# Patient Record
Sex: MALE | Race: WHITE | Employment: FULL TIME | URBAN - METROPOLITAN AREA
[De-identification: names, ages, dates, MRNs, and addresses within clinical notes are randomized per-mention and may not be internally consistent; named-entity substitution may affect disease eponyms.]

---

## 2020-09-09 ENCOUNTER — HOSPITAL ENCOUNTER (EMERGENCY)
Facility: HOSPITAL | Age: 30
Discharge: HOME | End: 2020-09-09
Attending: EMERGENCY MEDICINE
Payer: COMMERCIAL

## 2020-09-09 ENCOUNTER — HOSPITAL ENCOUNTER (OUTPATIENT)
Age: 30
Discharge: HOME | End: 2020-09-09
Attending: EMERGENCY MEDICINE
Payer: COMMERCIAL

## 2020-09-09 ENCOUNTER — APPOINTMENT (OUTPATIENT)
Dept: GENERAL RADIOLOGY | Facility: HOSPITAL | Age: 30
End: 2020-09-09
Attending: EMERGENCY MEDICINE
Payer: COMMERCIAL

## 2020-09-09 VITALS
HEART RATE: 79 BPM | BODY MASS INDEX: 32.88 KG/M2 | TEMPERATURE: 98 F | HEIGHT: 76 IN | SYSTOLIC BLOOD PRESSURE: 150 MMHG | RESPIRATION RATE: 18 BRPM | OXYGEN SATURATION: 97 % | DIASTOLIC BLOOD PRESSURE: 107 MMHG | WEIGHT: 270 LBS

## 2020-09-09 VITALS
OXYGEN SATURATION: 99 % | DIASTOLIC BLOOD PRESSURE: 90 MMHG | TEMPERATURE: 98 F | HEART RATE: 59 BPM | BODY MASS INDEX: 33 KG/M2 | SYSTOLIC BLOOD PRESSURE: 148 MMHG | WEIGHT: 268.94 LBS | RESPIRATION RATE: 18 BRPM

## 2020-09-09 DIAGNOSIS — I10 HYPERTENSION, UNSPECIFIED TYPE: ICD-10-CM

## 2020-09-09 DIAGNOSIS — J06.9 UPPER RESPIRATORY TRACT INFECTION, UNSPECIFIED TYPE: Primary | ICD-10-CM

## 2020-09-09 DIAGNOSIS — R07.9 CHEST PAIN OF UNCERTAIN ETIOLOGY: Primary | ICD-10-CM

## 2020-09-09 LAB
ANION GAP SERPL CALC-SCNC: 5 MMOL/L (ref 0–18)
BASOPHILS # BLD AUTO: 0.06 X10(3) UL (ref 0–0.2)
BASOPHILS NFR BLD AUTO: 0.9 %
BUN BLD-MCNC: 10 MG/DL (ref 7–18)
BUN/CREAT SERPL: 8.5 (ref 10–20)
CALCIUM BLD-MCNC: 9.5 MG/DL (ref 8.5–10.1)
CHLORIDE SERPL-SCNC: 104 MMOL/L (ref 98–112)
CO2 SERPL-SCNC: 29 MMOL/L (ref 21–32)
CREAT BLD-MCNC: 1.18 MG/DL (ref 0.7–1.3)
D DIMER PPP FEU-MCNC: <0.27 UG/ML FEU (ref ?–0.5)
DEPRECATED RDW RBC AUTO: 37.9 FL (ref 35.1–46.3)
EOSINOPHIL # BLD AUTO: 0.14 X10(3) UL (ref 0–0.7)
EOSINOPHIL NFR BLD AUTO: 2.1 %
ERYTHROCYTE [DISTWIDTH] IN BLOOD BY AUTOMATED COUNT: 12.1 % (ref 11–15)
GLUCOSE BLD-MCNC: 96 MG/DL (ref 70–99)
HCT VFR BLD AUTO: 49.4 % (ref 39–53)
HGB BLD-MCNC: 17.3 G/DL (ref 13–17.5)
IMM GRANULOCYTES # BLD AUTO: 0.02 X10(3) UL (ref 0–1)
IMM GRANULOCYTES NFR BLD: 0.3 %
LYMPHOCYTES # BLD AUTO: 1.34 X10(3) UL (ref 1–4)
LYMPHOCYTES NFR BLD AUTO: 20.4 %
MCH RBC QN AUTO: 30.3 PG (ref 26–34)
MCHC RBC AUTO-ENTMCNC: 35 G/DL (ref 31–37)
MCV RBC AUTO: 86.5 FL (ref 80–100)
MONOCYTES # BLD AUTO: 0.37 X10(3) UL (ref 0.1–1)
MONOCYTES NFR BLD AUTO: 5.6 %
NEUTROPHILS # BLD AUTO: 4.65 X10 (3) UL (ref 1.5–7.7)
NEUTROPHILS # BLD AUTO: 4.65 X10(3) UL (ref 1.5–7.7)
NEUTROPHILS NFR BLD AUTO: 70.7 %
OSMOLALITY SERPL CALC.SUM OF ELEC: 285 MOSM/KG (ref 275–295)
PLATELET # BLD AUTO: 237 10(3)UL (ref 150–450)
POTASSIUM SERPL-SCNC: 3.8 MMOL/L (ref 3.5–5.1)
RBC # BLD AUTO: 5.71 X10(6)UL (ref 4.3–5.7)
SODIUM SERPL-SCNC: 138 MMOL/L (ref 136–145)
TROPONIN I SERPL-MCNC: <0.045 NG/ML (ref ?–0.04)
WBC # BLD AUTO: 6.6 X10(3) UL (ref 4–11)

## 2020-09-09 PROCEDURE — 99204 OFFICE O/P NEW MOD 45 MIN: CPT

## 2020-09-09 PROCEDURE — 84484 ASSAY OF TROPONIN QUANT: CPT | Performed by: EMERGENCY MEDICINE

## 2020-09-09 PROCEDURE — 93005 ELECTROCARDIOGRAM TRACING: CPT

## 2020-09-09 PROCEDURE — 36415 COLL VENOUS BLD VENIPUNCTURE: CPT

## 2020-09-09 PROCEDURE — 71045 X-RAY EXAM CHEST 1 VIEW: CPT | Performed by: EMERGENCY MEDICINE

## 2020-09-09 PROCEDURE — 93010 ELECTROCARDIOGRAM REPORT: CPT | Performed by: EMERGENCY MEDICINE

## 2020-09-09 PROCEDURE — 93010 ELECTROCARDIOGRAM REPORT: CPT

## 2020-09-09 PROCEDURE — 80048 BASIC METABOLIC PNL TOTAL CA: CPT | Performed by: EMERGENCY MEDICINE

## 2020-09-09 PROCEDURE — 85379 FIBRIN DEGRADATION QUANT: CPT | Performed by: EMERGENCY MEDICINE

## 2020-09-09 PROCEDURE — 85025 COMPLETE CBC W/AUTO DIFF WBC: CPT | Performed by: EMERGENCY MEDICINE

## 2020-09-09 PROCEDURE — 99284 EMERGENCY DEPT VISIT MOD MDM: CPT

## 2020-09-09 RX ORDER — ALBUTEROL SULFATE 90 UG/1
2 AEROSOL, METERED RESPIRATORY (INHALATION) EVERY 4 HOURS PRN
Qty: 1 INHALER | Refills: 0 | Status: SHIPPED | OUTPATIENT
Start: 2020-09-09 | End: 2020-10-09

## 2020-09-09 NOTE — ED PROVIDER NOTES
Patient Seen in: [facility] AND [facility] Emergency Department      History   Patient presents with:  Chest Pain Angina    Stated Complaint: chest pain from IC    HPI    Patient is a 26-year-old male who complains of chest discomfort he states he has had interm light.   Neck: Neck supple. Cardiovascular: Normal rate, regular rhythm, normal heart sounds and intact distal pulses. Pulmonary/Chest: Effort normal and breath sounds faint expiratory wheeze. No respiratory distress. Abdominal: Soft.  Bowel sounds a changes              Xr Chest Ap Portable  (cpt=71045)    Result Date: 9/9/2020  CONCLUSION: Normal examination. Dictated by (CST): Cm Santacruz MD on 9/09/2020 at 2:22 PM     Finalized by (CST):  Cm Santacruz MD on 9/09/2020 at 2:23 PM

## 2020-09-09 NOTE — ED PROVIDER NOTES
Patient Seen in: [facility] [place]      History   Patient presents with:  Chest Pressure    Stated Complaint: chest tightness    HPI    The patient is a 61-year-old male with past history of tobacco abuse, vaping who presents no Well-developed well-nourished in no acute distress  Head: Normocephalic, no swelling or tenderness  Eyes: Nonicteric sclera, no conjunctival injection  ENT: TMs are clear and flat bilaterally.   There is no posterior pharyngeal erythema  Chest: Clear to [place]

## 2020-09-09 NOTE — ED INITIAL ASSESSMENT (HPI)
PATIENT ARRIVED AMBULATORY TO ROOM C/O \"CHEST PRESSURE AND CHEST TIGHTNESS\" THAT STARTED YESTERDAY. PATIENT STATES THE PRESSURE TODAY IS CONCENTRATED IN HIS GENERALIZED CHEST. NO COUGH. NO NASAL CONGESTION. NO FEVERS.  PATIENT STATES \"I FEEL FUNNY WHEN I

## 2020-09-09 NOTE — ED INITIAL ASSESSMENT (HPI)
Pt presents with 4 days of mid sternal CP and difficulty taking a deep breath. Pt denies n/v, denies cough, fever or diaphoresis.  Sent from [address] [place] [address] for further eval

## 2020-09-10 LAB — SARS-COV-2 RNA RESP QL NAA+PROBE: NOT DETECTED

## 2023-02-14 ENCOUNTER — APPOINTMENT (OUTPATIENT)
Dept: GENERAL RADIOLOGY | Facility: HOSPITAL | Age: 33
End: 2023-02-14
Payer: COMMERCIAL

## 2023-02-14 ENCOUNTER — HOSPITAL ENCOUNTER (EMERGENCY)
Facility: HOSPITAL | Age: 33
Discharge: HOME | End: 2023-02-14
Attending: EMERGENCY MEDICINE
Payer: COMMERCIAL

## 2023-02-14 VITALS
DIASTOLIC BLOOD PRESSURE: 97 MMHG | HEIGHT: 76 IN | OXYGEN SATURATION: 98 % | SYSTOLIC BLOOD PRESSURE: 162 MMHG | BODY MASS INDEX: 32.27 KG/M2 | RESPIRATION RATE: 16 BRPM | WEIGHT: 265 LBS | TEMPERATURE: 98 F | HEART RATE: 97 BPM

## 2023-02-14 DIAGNOSIS — M54.12 CERVICAL RADICULOPATHY: Primary | ICD-10-CM

## 2023-02-14 LAB
ALBUMIN SERPL-MCNC: 3.8 G/DL (ref 3.4–5)
ALBUMIN/GLOB SERPL: 1.2 {RATIO} (ref 1–2)
ALP LIVER SERPL-CCNC: 70 U/L
ALT SERPL-CCNC: 28 U/L
ANION GAP SERPL CALC-SCNC: 3 MMOL/L (ref 0–18)
AST SERPL-CCNC: 26 U/L (ref 15–37)
ATRIAL RATE: 77 BPM
BASOPHILS # BLD AUTO: 0.07 X10(3) UL (ref 0–0.2)
BASOPHILS NFR BLD AUTO: 1.1 %
BILIRUB SERPL-MCNC: 0.9 MG/DL (ref 0.1–2)
BUN BLD-MCNC: 16 MG/DL (ref 7–18)
CALCIUM BLD-MCNC: 8.8 MG/DL (ref 8.5–10.1)
CHLORIDE SERPL-SCNC: 109 MMOL/L (ref 98–112)
CO2 SERPL-SCNC: 26 MMOL/L (ref 21–32)
CREAT BLD-MCNC: 1.21 MG/DL
EOSINOPHIL # BLD AUTO: 0.34 X10(3) UL (ref 0–0.7)
EOSINOPHIL NFR BLD AUTO: 5.6 %
ERYTHROCYTE [DISTWIDTH] IN BLOOD BY AUTOMATED COUNT: 11.9 %
GFR SERPLBLD BASED ON 1.73 SQ M-ARVRAT: 82 ML/MIN/1.73M2 (ref 60–?)
GLOBULIN PLAS-MCNC: 3.3 G/DL (ref 2.8–4.4)
GLUCOSE BLD-MCNC: 92 MG/DL (ref 70–99)
HCT VFR BLD AUTO: 44.4 %
HGB BLD-MCNC: 15.9 G/DL
IMM GRANULOCYTES # BLD AUTO: 0.01 X10(3) UL (ref 0–1)
IMM GRANULOCYTES NFR BLD: 0.2 %
LYMPHOCYTES # BLD AUTO: 1.54 X10(3) UL (ref 1–4)
LYMPHOCYTES NFR BLD AUTO: 25.2 %
MCH RBC QN AUTO: 31.1 PG (ref 26–34)
MCHC RBC AUTO-ENTMCNC: 35.8 G/DL (ref 31–37)
MCV RBC AUTO: 86.7 FL
MONOCYTES # BLD AUTO: 0.34 X10(3) UL (ref 0.1–1)
MONOCYTES NFR BLD AUTO: 5.6 %
NEUTROPHILS # BLD AUTO: 3.8 X10 (3) UL (ref 1.5–7.7)
NEUTROPHILS # BLD AUTO: 3.8 X10(3) UL (ref 1.5–7.7)
NEUTROPHILS NFR BLD AUTO: 62.3 %
OSMOLALITY SERPL CALC.SUM OF ELEC: 287 MOSM/KG (ref 275–295)
P AXIS: 68 DEGREES
P-R INTERVAL: 146 MS
PLATELET # BLD AUTO: 215 10(3)UL (ref 150–450)
POTASSIUM SERPL-SCNC: 3.8 MMOL/L (ref 3.5–5.1)
PROT SERPL-MCNC: 7.1 G/DL (ref 6.4–8.2)
Q-T INTERVAL: 370 MS
QRS DURATION: 118 MS
QTC CALCULATION (BEZET): 418 MS
R AXIS: 61 DEGREES
RBC # BLD AUTO: 5.12 X10(6)UL
SODIUM SERPL-SCNC: 138 MMOL/L (ref 136–145)
T AXIS: 41 DEGREES
TROPONIN I HIGH SENSITIVITY: 4 NG/L
VENTRICULAR RATE: 77 BPM
WBC # BLD AUTO: 6.1 X10(3) UL (ref 4–11)

## 2023-02-14 PROCEDURE — 93010 ELECTROCARDIOGRAM REPORT: CPT

## 2023-02-14 PROCEDURE — 93005 ELECTROCARDIOGRAM TRACING: CPT

## 2023-02-14 PROCEDURE — 80053 COMPREHEN METABOLIC PANEL: CPT | Performed by: EMERGENCY MEDICINE

## 2023-02-14 PROCEDURE — 84484 ASSAY OF TROPONIN QUANT: CPT | Performed by: EMERGENCY MEDICINE

## 2023-02-14 PROCEDURE — 85025 COMPLETE CBC W/AUTO DIFF WBC: CPT | Performed by: EMERGENCY MEDICINE

## 2023-02-14 PROCEDURE — 99284 EMERGENCY DEPT VISIT MOD MDM: CPT

## 2023-02-14 PROCEDURE — 84484 ASSAY OF TROPONIN QUANT: CPT

## 2023-02-14 PROCEDURE — 71045 X-RAY EXAM CHEST 1 VIEW: CPT

## 2023-02-14 PROCEDURE — 36415 COLL VENOUS BLD VENIPUNCTURE: CPT

## 2023-02-14 PROCEDURE — 85025 COMPLETE CBC W/AUTO DIFF WBC: CPT

## 2023-02-14 PROCEDURE — 99285 EMERGENCY DEPT VISIT HI MDM: CPT

## 2023-02-14 PROCEDURE — 80053 COMPREHEN METABOLIC PANEL: CPT

## 2023-02-14 RX ORDER — PREDNISONE 20 MG/1
60 TABLET ORAL DAILY
Qty: 15 TABLET | Refills: 0 | Status: SHIPPED | OUTPATIENT
Start: 2023-02-14 | End: 2023-02-19

## 2023-02-14 NOTE — ED INITIAL ASSESSMENT (HPI)
Pt states he woke today with left arm pain.  Pt states pain is shooting and moved from hand to forearm into chest.

## 2023-02-14 NOTE — DISCHARGE INSTRUCTIONS
Motrin every 6 hours for pain    Take ibuprofen 600 mg 4 times daily    Return if any worsening or increased symptoms.     You may need an MRI of your cervical spine to rule out disc herniation if symptoms persist

## 2023-03-06 ENCOUNTER — OFFICE VISIT (OUTPATIENT)
Dept: INTERNAL MEDICINE CLINIC | Facility: CLINIC | Age: 33
End: 2023-03-06

## 2023-03-06 VITALS
BODY MASS INDEX: 33.49 KG/M2 | DIASTOLIC BLOOD PRESSURE: 92 MMHG | SYSTOLIC BLOOD PRESSURE: 135 MMHG | WEIGHT: 275 LBS | HEIGHT: 76 IN | HEART RATE: 68 BPM

## 2023-03-06 DIAGNOSIS — M54.12 CERVICAL RADICULOPATHY: ICD-10-CM

## 2023-03-06 DIAGNOSIS — Z13.21 ENCOUNTER FOR VITAMIN DEFICIENCY SCREENING: ICD-10-CM

## 2023-03-06 DIAGNOSIS — I10 ESSENTIAL HYPERTENSION: ICD-10-CM

## 2023-03-06 DIAGNOSIS — R07.9 CHEST PAIN, UNSPECIFIED TYPE: Primary | ICD-10-CM

## 2023-03-06 PROCEDURE — 3008F BODY MASS INDEX DOCD: CPT | Performed by: NURSE PRACTITIONER

## 2023-03-06 PROCEDURE — 3075F SYST BP GE 130 - 139MM HG: CPT | Performed by: NURSE PRACTITIONER

## 2023-03-06 PROCEDURE — 99204 OFFICE O/P NEW MOD 45 MIN: CPT | Performed by: NURSE PRACTITIONER

## 2023-03-06 PROCEDURE — 3080F DIAST BP >= 90 MM HG: CPT | Performed by: NURSE PRACTITIONER

## 2023-03-06 RX ORDER — AMLODIPINE BESYLATE 5 MG/1
5 TABLET ORAL DAILY
Qty: 90 TABLET | Refills: 0 | Status: SHIPPED | OUTPATIENT
Start: 2023-03-06 | End: 2023-06-04

## 2023-03-07 ENCOUNTER — TELEPHONE (OUTPATIENT)
Dept: INTERNAL MEDICINE CLINIC | Facility: CLINIC | Age: 33
End: 2023-03-07

## 2023-03-07 NOTE — TELEPHONE ENCOUNTER
Called Cardiology to schedule an appt for patient. Patient is schedule for March 13 at 3:20pm at [address] in [place] [address]. Patient was informed about the appt, patient didn't have no further questions.

## 2023-03-08 ENCOUNTER — LAB ENCOUNTER (OUTPATIENT)
Dept: LAB | Age: 33
End: 2023-03-08
Attending: NURSE PRACTITIONER
Payer: COMMERCIAL

## 2023-03-08 DIAGNOSIS — R07.9 CHEST PAIN, UNSPECIFIED TYPE: ICD-10-CM

## 2023-03-08 DIAGNOSIS — I10 ESSENTIAL HYPERTENSION: ICD-10-CM

## 2023-03-08 DIAGNOSIS — Z13.21 ENCOUNTER FOR VITAMIN DEFICIENCY SCREENING: ICD-10-CM

## 2023-03-08 LAB
CHOLEST SERPL-MCNC: 158 MG/DL (ref ?–200)
FASTING PATIENT LIPID ANSWER: YES
HDLC SERPL-MCNC: 54 MG/DL (ref 40–59)
LDLC SERPL CALC-MCNC: 88 MG/DL (ref ?–100)
NONHDLC SERPL-MCNC: 104 MG/DL (ref ?–130)
TRIGL SERPL-MCNC: 82 MG/DL (ref 30–149)
TSI SER-ACNC: 1.23 MIU/ML (ref 0.36–3.74)
VIT D+METAB SERPL-MCNC: 11 NG/ML (ref 30–100)
VLDLC SERPL CALC-MCNC: 13 MG/DL (ref 0–30)

## 2023-03-08 PROCEDURE — 36415 COLL VENOUS BLD VENIPUNCTURE: CPT

## 2023-03-08 PROCEDURE — 80061 LIPID PANEL: CPT

## 2023-03-08 PROCEDURE — 82306 VITAMIN D 25 HYDROXY: CPT

## 2023-03-08 PROCEDURE — 84443 ASSAY THYROID STIM HORMONE: CPT

## 2023-05-18 ENCOUNTER — OFFICE VISIT (OUTPATIENT)
Dept: INTERNAL MEDICINE CLINIC | Facility: CLINIC | Age: 33
End: 2023-05-18

## 2023-05-18 ENCOUNTER — HOSPITAL ENCOUNTER (EMERGENCY)
Facility: HOSPITAL | Age: 33
Discharge: HOME | End: 2023-05-18
Attending: EMERGENCY MEDICINE
Payer: COMMERCIAL

## 2023-05-18 ENCOUNTER — PATIENT MESSAGE (OUTPATIENT)
Dept: INTERNAL MEDICINE CLINIC | Facility: CLINIC | Age: 33
End: 2023-05-18

## 2023-05-18 VITALS
DIASTOLIC BLOOD PRESSURE: 74 MMHG | SYSTOLIC BLOOD PRESSURE: 138 MMHG | RESPIRATION RATE: 18 BRPM | TEMPERATURE: 98 F | OXYGEN SATURATION: 98 % | HEART RATE: 78 BPM | BODY MASS INDEX: 33 KG/M2 | WEIGHT: 273 LBS

## 2023-05-18 VITALS
WEIGHT: 273 LBS | SYSTOLIC BLOOD PRESSURE: 133 MMHG | HEIGHT: 76 IN | DIASTOLIC BLOOD PRESSURE: 85 MMHG | BODY MASS INDEX: 33.24 KG/M2 | HEART RATE: 73 BPM

## 2023-05-18 DIAGNOSIS — H57.02 ANISOCORIA: Primary | ICD-10-CM

## 2023-05-18 PROCEDURE — 99282 EMERGENCY DEPT VISIT SF MDM: CPT

## 2023-05-18 PROCEDURE — 3075F SYST BP GE 130 - 139MM HG: CPT | Performed by: NURSE PRACTITIONER

## 2023-05-18 PROCEDURE — 99283 EMERGENCY DEPT VISIT LOW MDM: CPT

## 2023-05-18 PROCEDURE — 99213 OFFICE O/P EST LOW 20 MIN: CPT | Performed by: NURSE PRACTITIONER

## 2023-05-18 PROCEDURE — 3008F BODY MASS INDEX DOCD: CPT | Performed by: NURSE PRACTITIONER

## 2023-05-18 PROCEDURE — 3079F DIAST BP 80-89 MM HG: CPT | Performed by: NURSE PRACTITIONER

## 2023-05-18 RX ORDER — SERTRALINE HYDROCHLORIDE 25 MG/1
TABLET, FILM COATED ORAL
COMMUNITY
Start: 2023-03-01

## 2023-05-18 NOTE — ED INITIAL ASSESSMENT (HPI)
Patient complains of headache and bilateral dilation of pupils, larger to left, x 4 days. Saw optho today who suggested MRA. Patient denies visual changes. A/o x 4.

## 2023-05-19 NOTE — DISCHARGE INSTRUCTIONS
Take Tylenol as needed for headache. Stay hydrated and get rest.    See primary care for follow-up. Return to the ER if you develop worsening symptoms, sudden severe headache, change in vision, weakness or numbness on one side your body, slurred speech, or any emergent concerns.

## 2023-05-19 NOTE — TELEPHONE ENCOUNTER
Darnell Banegas, please see patient's mychart message. He did go to ER yesterday but they did not do any testing.

## 2023-05-22 ENCOUNTER — HOSPITAL ENCOUNTER (OUTPATIENT)
Dept: MRI IMAGING | Age: 33
Discharge: HOME | End: 2023-05-22
Attending: NURSE PRACTITIONER
Payer: COMMERCIAL

## 2023-05-22 DIAGNOSIS — H57.02 ANISOCORIA: ICD-10-CM

## 2023-05-22 PROCEDURE — 70544 MR ANGIOGRAPHY HEAD W/O DYE: CPT | Performed by: NURSE PRACTITIONER

## 2023-07-10 ENCOUNTER — PATIENT MESSAGE (OUTPATIENT)
Dept: INTERNAL MEDICINE CLINIC | Facility: CLINIC | Age: 33
End: 2023-07-10

## 2023-07-11 NOTE — TELEPHONE ENCOUNTER
From: Nitesh Warren  To: OZIEL Nixon  Sent: 7/10/2023 10:26 AM CDT  Subject: MRI    Hello,    Insurance has denied to cover the MRI that was suggested to check my brain for anything related to my eye problem.  I would like to see if you can reach out and let them know you thought it was a necessary test.    Thanks,    Mikey Canseco

## 2023-07-11 NOTE — TELEPHONE ENCOUNTER
[employer] message with [facility] OZIEL recommendation sent to pt.      Future Appointments   Date Time Provider Roshan Briana   7/18/2023  3:00 PM Rafal Lopez MD PM[facility] ADO  [place]

## 2023-07-11 NOTE — TELEPHONE ENCOUNTER
Pt was evaluated by MRA in the ER which was negative. The next step would be to see the ophthalmologist. They will decide if any further brain imaging is necessary but he also needs a dilated eye exam to evaluate the anisocoria. Thank you!

## 2023-07-18 ENCOUNTER — HOSPITAL ENCOUNTER (OUTPATIENT)
Dept: GENERAL RADIOLOGY | Age: 33
Discharge: HOME | End: 2023-07-18
Attending: PHYSICAL MEDICINE & REHABILITATION
Payer: COMMERCIAL

## 2023-07-18 ENCOUNTER — OFFICE VISIT (OUTPATIENT)
Dept: PHYSICAL MEDICINE AND REHAB | Facility: CLINIC | Age: 33
End: 2023-07-18
Payer: COMMERCIAL

## 2023-07-18 VITALS — HEART RATE: 80 BPM | SYSTOLIC BLOOD PRESSURE: 152 MMHG | DIASTOLIC BLOOD PRESSURE: 88 MMHG | OXYGEN SATURATION: 99 %

## 2023-07-18 DIAGNOSIS — R20.0 NUMBNESS AND TINGLING IN LEFT ARM: ICD-10-CM

## 2023-07-18 DIAGNOSIS — R20.0 NUMBNESS AND TINGLING IN LEFT ARM: Primary | ICD-10-CM

## 2023-07-18 DIAGNOSIS — R20.2 NUMBNESS AND TINGLING IN LEFT ARM: Primary | ICD-10-CM

## 2023-07-18 DIAGNOSIS — R20.2 NUMBNESS AND TINGLING IN LEFT ARM: ICD-10-CM

## 2023-07-18 DIAGNOSIS — R29.2 HOFFMAN SIGN PRESENT: ICD-10-CM

## 2023-07-18 PROCEDURE — 72050 X-RAY EXAM NECK SPINE 4/5VWS: CPT | Performed by: PHYSICAL MEDICINE & REHABILITATION

## 2023-07-18 PROCEDURE — 3077F SYST BP >= 140 MM HG: CPT | Performed by: PHYSICAL MEDICINE & REHABILITATION

## 2023-07-18 PROCEDURE — 3079F DIAST BP 80-89 MM HG: CPT | Performed by: PHYSICAL MEDICINE & REHABILITATION

## 2023-07-18 PROCEDURE — 99204 OFFICE O/P NEW MOD 45 MIN: CPT | Performed by: PHYSICAL MEDICINE & REHABILITATION

## 2023-07-18 NOTE — PATIENT INSTRUCTIONS
1) Please call and schedule your MRI cervical spine at [phone]. Once you have your MRI scheduled, then call my office again to schedule a follow-up visit soon after your MRI so we may review the images together. 2) Schedule EMG/Nerve study on your way out today  3) Please get X-rays of the Cervical spine today on your way out.

## 2023-07-18 NOTE — PROGRESS NOTES
[address]  Progress Note    CHIEF COMPLAINT:  Patient presents with:  New Patient: Left sided arm pain and numbness . Patient was in  the ER on 5/18 and was diagnosed with a pinched nerve. He complains about Tingling in finger tips and pain of 5/10 . Pain is worse when he gets it in the  wrist. Pain can vary on any spot on the left arm. He describes it as having a heart attack when he does have a pain attack. This all started 4 months ago. Denies injury. No imaging. No prior PT nor csi. History of Present Illness: The patient is a 33year old right-handed male who presents with left arm tingling radiating into the left hand which has been ongoing for the last couple of months without an inciting event. He was seen in the emergency department where an MRA of the brain was performed to evaluate for stroke or aneurysm. This was negative. Since then, he has had a cardiac work-up which has also been negative. Today he rates his discomfort a 5 out of 10. He is unclear of what aggravates or exacerbates his symptoms. He has noticed that when he is laying supine in his bed or also driving. He is unclear of what resolves his symptoms. He is not taking any medications for this. No imaging has been performed. No electrodiagnostics performed. He denies injections or therapies. He denies any weakness or incontinence. PAST MEDICAL HISTORY:  Past Medical History:   Diagnosis Date    Essential hypertension        SURGICAL HISTORY:  History reviewed. No pertinent surgical history. SOCIAL HISTORY:   Social History    Occupational History      Not on file    Tobacco Use      Smoking status: Former      Smokeless tobacco: Never    Vaping Use      Vaping Use: Every day    Substance and Sexual Activity      Alcohol use: Yes        Comment: OCC      Drug use: Yes        Types: Cannabis      Sexual activity: Not on file      FAMILY HISTORY:   History reviewed.  No pertinent family history. CURRENT MEDICATIONS:   Current Outpatient Medications   Medication Sig Dispense Refill    amLODIPine 5 MG Oral Tab Take 1 tablet (5 mg total) by mouth daily. 90 tablet 3    sertraline 25 MG Oral Tab  (Patient not taking: Reported on 7/18/2023)         ALLERGIES:   No Known Allergies    REVIEW OF SYSTEMS:   Review of Systems   Constitutional: Negative. HENT: Negative. Eyes: Negative. Respiratory: Negative. Cardiovascular: Negative. Gastrointestinal: Negative. Genitourinary: Negative. Musculoskeletal: As per HPI  Skin: Negative. Neurological: As per HPI  Endo/Heme/Allergies: Negative. Psychiatric/Behavioral: Negative. All other systems reviewed and are negative. Pertinent positives and negatives noted in the HPI. PHYSICAL EXAM:   /88   Pulse 80   SpO2 99%     There is no height or weight on file to calculate BMI. General: No immediate distress  Head: Normocephalic/ Atraumatic  Eyes: Extra-occular movements intact. Ears: No auricular hematoma or deformities  Mouth: No lesions or ulcerations  Heart: peripheral pulses intact. Normal capillary refill. Lungs: Non-labored respirations  Abdomen: No abdominal guarding  Extremities: No lower extremity edema bilaterally   Skin: No lesions noted. Cognition: alert & oriented x 3, attentive, able to follow 2 step commands, comprehention intact, spontaneous speech intact  Motor:    Musculoskeletal:    CERVICAL SPINE:  Inspection: no erythema, swelling, or obvious deformity  Palpation: no ttp over spinous process or paraspinal muscles bilaterally. No tenderness to palpation over Z-joints bilaterally.    ROM: intact to all planes of motion of cervical spine including side-bend bilaterally, rotation bilaterally, flexion, and extension   Strength: 5/5 in all myotomes of the BILATERAL upper extremities   Sensation: Intact to light touch in all dermatomes of the BILATERAL upper extremities   Reflexes: 3/4 at C5, C6, C7 with a positive Kenney's sign bilaterally  Spurling Test: negative for radicular symptoms down either extremity bilaterally  Tinel's sign: Positive at the right elbow and wrist as well as left wrist      Gait:  Normal    Data  [facility] Lab Encounter on 03/08/2023   Component Date Value Ref Range Status    TSH 03/08/2023 1.230  0.358 - 3.740 mIU/mL Final    Cholesterol, Total 03/08/2023 158  <200 mg/dL Final    HDL Cholesterol 03/08/2023 54  40 - 59 mg/dL Final    Triglycerides 03/08/2023 82  30 - 149 mg/dL Final    LDL Cholesterol 03/08/2023 88  <100 mg/dL Final    VLDL 03/08/2023 13  0 - 30 mg/dL Final    Non HDL Chol 03/08/2023 104  <130 mg/dL Final    Patient Fasting for Lipid? 03/08/2023 Yes   Final    Vitamin D, 25OH, Total 03/08/2023 11.0 (L)  30.0 - 100.0 ng/mL Final   Admission on 02/14/2023, Discharged on 02/14/2023   Component Date Value Ref Range Status    Ventricular rate 02/14/2023 77  BPM Final    Atrial rate 02/14/2023 77  BPM Final    P-R Interval 02/14/2023 146  ms Final    QRS Duration 02/14/2023 118  ms Final    Q-T Interval 02/14/2023 370  ms Final    QTC Calculation (Bezet) 02/14/2023 418  ms Final    P Axis 02/14/2023 68  degrees Final    R Axis 02/14/2023 61  degrees Final    T Axis 02/14/2023 41  degrees Final    Hold Lavender 02/14/2023 Auto Resulted   Final    Hold Lt Green 02/14/2023 Auto Resulted   Final    Hold Blue 02/14/2023 Auto Resulted   Final    Glucose 02/14/2023 92  70 - 99 mg/dL Final    Sodium 02/14/2023 138  136 - 145 mmol/L Final    Potassium 02/14/2023 3.8  3.5 - 5.1 mmol/L Final    Chloride 02/14/2023 109  98 - 112 mmol/L Final    CO2 02/14/2023 26.0  21.0 - 32.0 mmol/L Final    Anion Gap 02/14/2023 3  0 - 18 mmol/L Final    BUN 02/14/2023 16  7 - 18 mg/dL Final    Creatinine 02/14/2023 1.21  0.70 - 1.30 mg/dL Final    Calcium, Total 02/14/2023 8.8  8.5 - 10.1 mg/dL Final    Calculated Osmolality 02/14/2023 287  275 - 295 mOsm/kg Final    eGFR-Cr 02/14/2023 82 >=60 mL/min/1.73m2 Final    AST 02/14/2023 26  15 - 37 U/L Final    ALT 02/14/2023 28  16 - 61 U/L Final    Alkaline Phosphatase 02/14/2023 70  45 - 117 U/L Final    Bilirubin, Total 02/14/2023 0.9  0.1 - 2.0 mg/dL Final    Total Protein 02/14/2023 7.1  6.4 - 8.2 g/dL Final    Albumin 02/14/2023 3.8  3.4 - 5.0 g/dL Final    Globulin  02/14/2023 3.3  2.8 - 4.4 g/dL Final    A/G Ratio 02/14/2023 1.2  1.0 - 2.0 Final    Troponin I (High Sensitivity) 02/14/2023 4  <=79 ng/L Final    WBC 02/14/2023 6.1  4.0 - 11.0 x10(3) uL Final    RBC 02/14/2023 5.12  4.30 - 5.70 x10(6)uL Final    HGB 02/14/2023 15.9  13.0 - 17.5 g/dL Final    HCT 02/14/2023 44.4  39.0 - 53.0 % Final    PLT 02/14/2023 215.0  150.0 - 450.0 10(3)uL Final    MCV 02/14/2023 86.7  80.0 - 100.0 fL Final    MCH 02/14/2023 31.1  26.0 - 34.0 pg Final    MCHC 02/14/2023 35.8  31.0 - 37.0 g/dL Final    RDW 02/14/2023 11.9  % Final    Neutrophil Absolute Prelim 02/14/2023 3.80  1.50 - 7.70 x10 (3) uL Final    Neutrophil Absolute 02/14/2023 3.80  1.50 - 7.70 x10(3) uL Final    Lymphocyte Absolute 02/14/2023 1.54  1.00 - 4.00 x10(3) uL Final    Monocyte Absolute 02/14/2023 0.34  0.10 - 1.00 x10(3) uL Final    Eosinophil Absolute 02/14/2023 0.34  0.00 - 0.70 x10(3) uL Final    Basophil Absolute 02/14/2023 0.07  0.00 - 0.20 x10(3) uL Final    Immature Granulocyte Absolute 02/14/2023 0.01  0.00 - 1.00 x10(3) uL Final    Neutrophil % 02/14/2023 62.3  % Final    Lymphocyte % 02/14/2023 25.2  % Final    Monocyte % 02/14/2023 5.6  % Final    Eosinophil % 02/14/2023 5.6  % Final    Basophil % 02/14/2023 1.1  % Final    Immature Granulocyte % 02/14/2023 0.2  % Final   ]      Radiology Imaging:  I reviewed with the patient his MRI of the brain from 5/22/2023  MRA BRAIN ([id number])  Narrative: PROCEDURE: MRA BRAIN ([id number]     COMPARISON: None. INDICATIONS: H57.02 Anisocoria.      TECHNIQUE: MR angiography was without contrast material.  Multiplanar reconstructed 2D and 3D images of the cerebral arteries were created and interpreted. FINDINGS:   INTERNAL CAROTIDS: Flow identified. No significant stenosis. ANTERIOR CEREBRALS: Flow identified. No significant stenosis. Diminutive right SANTOSH. MIDDLE CEREBRALS: Flow identified. No significant stenosis. POSTERIOR CEREBRALS: Flow identified. No significant stenosis. ANTERIOR COMMUNICATOR: Flow identified. No significant stenosis. POSTERIOR COMMUNICATORS: Flow identified. No significant stenosis. BASILAR: Flow identified. No significant stenosis. VERTEBRALS: Flow identified. No significant stenosis. Diminutive right vertebral  SUPERIOR CEREBELLAR: Flow identified at origins. ANEURYSMS: None. VASCULAR MALFORMATIONS: None. Impression: CONCLUSION: Widely patent intracranial arteries. No aneurysm            Dictated by (CST): Mary Ellen Martin MD on 5/22/2023 at 7:06 PM       Finalized by (CST): Mary Ellen Martin MD on 5/22/2023 at 7:11 PM              ASSESSMENT AND PLAN:  Jabari Mulligan is a 23-year-old male who presents with left-sided arm tingling into digits 1 through 4 which has been ongoing for the last couple of months. On exam, he does have a positive Sudarshan's bilaterally. I am recommending x-rays as well as an MRI of the cervical spine to further investigate his hyperreflexia. I have also recommended an EMG and nerve conduction study to evaluate for peripheral nerve disease. He will follow-up with me to review the MRI images and discuss the next steps. RTC in 2 to 4 weeks for MRI review  Discharge Instructions were provided as documented in AVS summary. The patient was in agreement with the assessment and plan. All questions were answered. There were no barriers to learning. Numbness and tingling in left arm  (primary encounter diagnosis)  Kenney sign present    Heron Curiel MD  Physical Medicine and Rehabilitation/Sports Medicine  MEDICAL [facility]

## 2023-08-02 ENCOUNTER — PATIENT MESSAGE (OUTPATIENT)
Dept: PHYSICAL MEDICINE AND REHAB | Facility: CLINIC | Age: 33
End: 2023-08-02

## 2023-08-03 NOTE — TELEPHONE ENCOUNTER
From: Angelito Warren  To: Noah Medina MD  Sent: 8/2/2023 5:48 PM CDT  Subject: Pre-authorization     My insurance company needs preauthorization for the MRI. They told me ypu need to reach out to them for that.  I have it scheduled for the 17th of August.

## 2023-08-17 ENCOUNTER — HOSPITAL ENCOUNTER (OUTPATIENT)
Dept: MRI IMAGING | Age: 33
Discharge: HOME | End: 2023-08-17
Attending: PHYSICAL MEDICINE & REHABILITATION
Payer: COMMERCIAL

## 2023-08-17 DIAGNOSIS — R20.2 NUMBNESS AND TINGLING IN LEFT ARM: ICD-10-CM

## 2023-08-17 DIAGNOSIS — R29.2 HOFFMAN SIGN PRESENT: ICD-10-CM

## 2023-08-17 DIAGNOSIS — R20.0 NUMBNESS AND TINGLING IN LEFT ARM: ICD-10-CM

## 2023-08-17 PROCEDURE — 72141 MRI NECK SPINE W/O DYE: CPT | Performed by: PHYSICAL MEDICINE & REHABILITATION

## 2023-08-23 NOTE — TELEPHONE ENCOUNTER
Please review. Protocol failed / Has no protocol. Requested Prescriptions   Pending Prescriptions Disp Refills    amLODIPine 5 MG Oral Tab 90 tablet 3     Sig: Take 1 tablet (5 mg total) by mouth daily. Hypertensive Medications Protocol Failed - 8/22/2023  7:13 AM        Failed - Last BP reading less than 140/90     BP Readings from Last 1 Encounters:  07/18/23 : 152/88              Failed - CMP or BMP in past 6 months     No results found for this or any previous visit (from the past 4392 hour(s)).             Passed - In person appointment in the past 12 or next 3 months     Recent Outpatient Visits              1 month ago Numbness and tingling in left arm    [facility], [place] [address], Cielo Humphries MD    Office Visit    3 months ago [facility], [address], [place], Odella Perches., APRN    Office Visit    5 months ago Chest pain, unspecified type    [facility], [address], [place], Odella Perches., APRN    Office Visit          Future Appointments         Provider Department Appt Notes    In 1 week Nemo Johnson MD [address] [place] [address]    In 2 weeks Nemo Johnson MD [facility], [address] [place] [address],[address], [place] F/u MRI & EMG               Passed - In person appointment or virtual visit in the past 6 months     Recent Outpatient Visits              1 month ago Numbness and tingling in left arm    [facility], [place] [address], Cielo Humphries MD    Office Visit    3 months ago [facility], [address] [facility]., APRN    Office Visit    5 months ago Chest pain, unspecified type    [facility], [address], [place], Odella Perches., APRN    Office Visit          Future Appointments         Provider Department Appt Notes    In 1 week Nemo Johnson MD [address] [place] [address] - [place]UE    In 2 weeks MD [facility], [facility] F/u MRI & EMG               Passed - EGFRCR or GFRNAA > 50     GFR Evaluation  EGFRCR: 82 , resulted on 2/14/2023             Future Appointments         Provider Department Appt Notes    In 1 week Jewels Macias  [facility], [address] [place] [address]    In 2 weeks Jewels Macias MD [address], [address] [facility] F/u MRI & EMG           Recent Outpatient Visits              1 month ago Numbness and tingling in left arm    [address], [address], Agustina Schwartz MD    Office Visit    3 months ago [facility] [address], [address] [facility], [place], Sim [place]as., APRN    Office Visit    5 months ago Chest pain, unspecified type    [facility], [address], [place], [place]., APRN    Office Visit

## 2023-08-24 RX ORDER — AMLODIPINE BESYLATE 5 MG/1
5 TABLET ORAL DAILY
Qty: 90 TABLET | Refills: 2 | Status: SHIPPED | OUTPATIENT
Start: 2023-08-24

## 2023-09-05 ENCOUNTER — PROCEDURE VISIT (OUTPATIENT)
Dept: PHYSICAL MEDICINE AND REHAB | Facility: CLINIC | Age: 33
End: 2023-09-05
Payer: COMMERCIAL

## 2023-09-05 DIAGNOSIS — R20.0 NUMBNESS AND TINGLING IN LEFT ARM: Primary | ICD-10-CM

## 2023-09-05 DIAGNOSIS — R20.2 NUMBNESS AND TINGLING IN LEFT ARM: Primary | ICD-10-CM

## 2023-09-05 DIAGNOSIS — R29.2 HOFFMAN SIGN PRESENT: ICD-10-CM

## 2023-09-05 PROCEDURE — 95912 NRV CNDJ TEST 11-12 STUDIES: CPT | Performed by: PHYSICAL MEDICINE & REHABILITATION

## 2023-09-05 PROCEDURE — 95886 MUSC TEST DONE W/N TEST COMP: CPT | Performed by: PHYSICAL MEDICINE & REHABILITATION

## 2023-09-05 PROCEDURE — 95887 MUSC TST DONE W/N TST NONEXT: CPT | Performed by: PHYSICAL MEDICINE & REHABILITATION

## 2023-09-08 ENCOUNTER — TELEMEDICINE (OUTPATIENT)
Dept: PHYSICAL MEDICINE AND REHAB | Facility: CLINIC | Age: 33
End: 2023-09-08
Payer: COMMERCIAL

## 2023-09-08 DIAGNOSIS — M54.12 CERVICAL RADICULOPATHY: ICD-10-CM

## 2023-09-08 DIAGNOSIS — M50.30 DDD (DEGENERATIVE DISC DISEASE), CERVICAL: ICD-10-CM

## 2023-09-08 DIAGNOSIS — M54.2 TRIGGER POINT OF NECK: ICD-10-CM

## 2023-09-08 DIAGNOSIS — R20.0 NUMBNESS AND TINGLING IN LEFT ARM: Primary | ICD-10-CM

## 2023-09-08 DIAGNOSIS — R29.2 HOFFMAN SIGN PRESENT: ICD-10-CM

## 2023-09-08 DIAGNOSIS — M48.02 FORAMINAL STENOSIS OF CERVICAL REGION: ICD-10-CM

## 2023-09-08 DIAGNOSIS — M47.812 CERVICAL FACET SYNDROME: ICD-10-CM

## 2023-09-08 DIAGNOSIS — R20.2 NUMBNESS AND TINGLING IN LEFT ARM: Primary | ICD-10-CM

## 2023-09-08 PROCEDURE — 99214 OFFICE O/P EST MOD 30 MIN: CPT | Performed by: PHYSICAL MEDICINE & REHABILITATION

## 2023-09-08 NOTE — PROGRESS NOTES
[address]  Video Visit Progress Note      Telehealth outside of [address] Verbal Consent   I conducted a telehealth visit with Osmel Villagran today, 09/08/23, which was completed using two-way, real-time interactive audio and video communication. This has been done in good homero to provide continuity of care in the best interest of the provider-patient relationship, due to the COVID -19 public health crisis/national emergency where restrictions of face-to-face office visits are ongoing. Every conscious effort was taken to allow for sufficient and adequate time to complete the visit. The patient was made aware of the limitations of the telehealth visit, including treatment limitations as no physical exam could be performed. The patient was advised to call 911 or to go to the ER in case there was an emergency. The patient was also advised of the potential privacy & security concerns related to the telehealth platform. The patient was made aware of where to find [facility] notice of privacy practices, telehealth consent form and other related consent forms and documents. which are located on the [facility] website. The patient verbally agreed to telehealth consent form, related consents and the risks discussed. Lastly, the patient confirmed that they were in [place]. Included in this visit, time may have been spent reviewing labs, medications, radiology tests and decision making. Appropriate medical decision-making and tests are ordered as detailed in the plan of care above. Coding/billing information is submitted for this visit based on complexity of care and/or time spent for the visit. CHIEF COMPLAINT:  Patient presents with:  Neck Pain  Neuropathy  Neurologic Problem  Numbness      History of Present Illness: The patient is a 33year old right-handed male who presents with left arm tingling radiating into the left hand.   An EMG of the upper extremities was performed and demonstrated a potential conduction block in between the elbow and wrist to the median nerve. He also had an MRI of the cervical spine performed which I independently reviewed with results as below. PAST MEDICAL HISTORY:  Past Medical History:   Diagnosis Date    Essential hypertension        SURGICAL HISTORY:  History reviewed. No pertinent surgical history. SOCIAL HISTORY:   Social History    Occupational History      Not on file    Tobacco Use      Smoking status: Former      Smokeless tobacco: Never    Vaping Use      Vaping Use: Every day    Substance and Sexual Activity      Alcohol use: Yes        Comment: OCC      Drug use: Yes        Types: Cannabis      Sexual activity: Not on file      FAMILY HISTORY:   History reviewed. No pertinent family history. CURRENT MEDICATIONS:   Current Outpatient Medications   Medication Sig Dispense Refill    amLODIPine 5 MG Oral Tab Take 1 tablet (5 mg total) by mouth daily. 90 tablet 2    sertraline 25 MG Oral Tab  (Patient not taking: Reported on 7/18/2023)         ALLERGIES:   No Known Allergies    REVIEW OF SYSTEMS:   Review of Systems   Constitutional: Negative. HENT: Negative. Eyes: Negative. Respiratory: Negative. Cardiovascular: Negative. Gastrointestinal: Negative. Genitourinary: Negative. Musculoskeletal: As per HPI  Skin: Negative. Neurological: As per HPI  Endo/Heme/Allergies: Negative. Psychiatric/Behavioral: Negative. All other systems reviewed and are negative. Pertinent positives and negatives noted in the HPI. PHYSICAL EXAM:     There is no height or weight on file to calculate BMI.     General: No immediate distress  Head: Normocephalic/ Atraumatic  Eyes: Extra-occular movements intact  Ears/Nose/Throat:  External appearance identifies normal appearance without obvious deformity  Cardiovascular: No cyanosis, clubbing or edema  Respiratory: Non-labored respirations  Skin: No lesions noted   Neurological: alert & oriented x 3, attentive, able to follow commands, comprehention intact, spontaneous speech intact  Psychiatric: Mood and affect appropriate        Data  No visits with results within 6 Month(s) from this visit. Latest known visit with results is:   [facility] Lab Encounter on 03/08/2023   Component Date Value Ref Range Status    TSH 03/08/2023 1.230  0.358 - 3.740 mIU/mL Final    Cholesterol, Total 03/08/2023 158  <200 mg/dL Final    HDL Cholesterol 03/08/2023 54  40 - 59 mg/dL Final    Triglycerides 03/08/2023 82  30 - 149 mg/dL Final    LDL Cholesterol 03/08/2023 88  <100 mg/dL Final    VLDL 03/08/2023 13  0 - 30 mg/dL Final    Non HDL Chol 03/08/2023 104  <130 mg/dL Final    Patient Fasting for Lipid? 03/08/2023 Yes   Final    Vitamin D, 25OH, Total 03/08/2023 11.0 (L)  30.0 - 100.0 ng/mL Final   ]      Radiology Imaging:  I reviewed with the patient his MRI of the cervical spine from 8/17/2023  MRI SPINE CERVICAL (CPT=72141)  Narrative: PROCEDURE: MRI SPINE CERVICAL (CPT=72141)     COMPARISON: [facility], XR CERVICAL SPINE AP LAT FLEX EXT EM (CPT=72050), 7/18/2023, 3:39 PM.     INDICATIONS: Left arm numbness and tingling/paresthesias over the preceding 4 months. TECHNIQUE: A variety of imaging planes and parameters were utilized for visualization of suspected pathology. FINDINGS:   ALIGNMENT: The anatomic cervical lordosis is preserved. BONES: No fractures or osseous lesions are apparent. Anterior osteophyte formation is seen, particularly at C5 and C6. Reciprocal endplate signal abnormalities are likely degenerative in nature. CORD: Normal caliber, contour, and signal intensity. CRANIOCERVICAL AREA: Normal foramen magnum without Chiari malformation. PARASPINAL AREA: No visible mass. OTHER: There is no visible swelling of the prevertebral soft tissues.      CERVICAL DISC LEVELS:  C2-C3: Bilateral uncovertebral hypertrophy is seen without significant spinal canal or foraminal impingement. C3-C4: Mild bilateral uncovertebral hypertrophy is seen, and there is mild facet arthropathy. There is no significant spinal canal or foraminal stenosis. C4-C5: Bilateral uncovertebral hypertrophy is seen with mild bilateral facet arthropathy. No significant spinal canal or foraminal stenosis results. C5-C6: There is posterior disc-osteophyte complex formation with bilateral uncovertebral hypertrophy. These findings contribute to mild bilateral foraminal stenosis. C6-C7: There is mild left greater than right uncovertebral hypertrophy with borderline left foraminal narrowing. C7-T1: There is mild left greater than right uncovertebral hypertrophy without significant spinal canal or foraminal impingement. Impression: CONCLUSION:   1. Minimal degenerative changes of the cervical spine with mild foraminal narrowing at C5-C6 and borderline left foraminal stenosis at C6-C7. 2. No abnormal intrinsic cord signal intensity is apparent. 3. No acute osseous injury of the cervical spine is detected. 4. Lesser incidental findings as above. Dictated by (CST): Nick Thakur MD on 8/17/2023 at 6:23 PM       Finalized by (CST): Nick Thakur MD on 8/17/2023 at 6:27 PM            EMG of the bilateral upper extremities performed on 9/5/2023 demonstrates the following:  Conclusion  This is a abnormal study. There is electrodiagnostic evidence for the following:  Left median axonal mononeuropathy. There is decrease in amplitude across the left forearm when testing the median nerve along with decrease in conduction velocity which may be due to a conduction block at the pronator teres. There is no evidence for a radiculopathy, plexopathy, polyneuropathy, or myopathy affecting the examined upper extremity. ASSESSMENT AND PLAN:  Roselia Ro is a pleasant 36-year-old male who presents for follow-up of his left-sided arm numbness and tingling.   I have reviewed his MRI of the cervical spine. I have also reviewed his EMG with him. He does have a possible compression neuropathy of the median nerve at the pronator muscle. He also has multilevel mild degenerative changes with mild foraminal narrowing on the left in the cervical spine which may be contributing to his symptoms. I am recommending a left C7-T1 interlaminar epidural steroid injection under local anesthesia. He will call us and schedule the procedure if he would like to proceed. He will then follow-up with me 2 weeks after the procedure. RTC in 2 weeks after procedure  Discharge Instructions were provided as documented in AVS summary. The patient was in agreement with the assessment and plan. All questions were answered. There were no barriers to learning. We discussed that a telemedicine visit is in place of an office visit; however, this limits the ability to perform a thorough physical examination which may affect objective findings related to a specific condition and can affect treatment. We also discussed that NSAIDs may mask or worsen COVID-19 infection symptoms. The patient was also informed that corticosteroids, in any form, may significantly decrease immune response and may increase risk and complications of infection. The patient was advised that given the current situation with COVID-19, it is in his/her best interest to socially distance his/herself. Given this, we are not recommending any elective procedures or office visits at the outpatient surgery center or in the office respectively unless deemed necessary. My staff will be reaching out to the patient for the elective procedure when it is considered appropriate and the patient can follow-up in office as well when appropriate. In the meantime, I will be available for telephone and video encounter. Numbness and tingling in left arm  (primary encounter diagnosis)  Kenney sign present    Heron Tony MD  Physical Medicine and Rehabilitation/Sports Medicine  MEDICAL [facility]

## 2023-09-08 NOTE — PATIENT INSTRUCTIONS
1) My office will call you to schedule the left C7-T1 interlaminar epidural steroid injection under local anesthesia once the procedure is approved by your insurance carrier. 2) Follow up with me 2 weeks after the procedure. This can be in the office or virtually.

## 2023-09-12 ENCOUNTER — TELEPHONE (OUTPATIENT)
Dept: PHYSICAL MEDICINE AND REHAB | Facility: CLINIC | Age: 33
End: 2023-09-12

## 2023-11-09 ENCOUNTER — OFFICE VISIT (OUTPATIENT)
Dept: INTERNAL MEDICINE CLINIC | Facility: CLINIC | Age: 33
End: 2023-11-09
Payer: COMMERCIAL

## 2023-11-09 VITALS
HEIGHT: 76 IN | SYSTOLIC BLOOD PRESSURE: 134 MMHG | DIASTOLIC BLOOD PRESSURE: 90 MMHG | WEIGHT: 288 LBS | BODY MASS INDEX: 35.07 KG/M2 | HEART RATE: 73 BPM

## 2023-11-09 DIAGNOSIS — Z13.21 ENCOUNTER FOR VITAMIN DEFICIENCY SCREENING: ICD-10-CM

## 2023-11-09 DIAGNOSIS — Z13.29 THYROID DISORDER SCREEN: ICD-10-CM

## 2023-11-09 DIAGNOSIS — Z13.220 LIPID SCREENING: ICD-10-CM

## 2023-11-09 DIAGNOSIS — I10 ESSENTIAL HYPERTENSION: ICD-10-CM

## 2023-11-09 DIAGNOSIS — Z00.00 WELLNESS EXAMINATION: Primary | ICD-10-CM

## 2023-11-09 DIAGNOSIS — Z13.0 SCREENING FOR DEFICIENCY ANEMIA: ICD-10-CM

## 2023-11-09 PROCEDURE — 99395 PREV VISIT EST AGE 18-39: CPT | Performed by: NURSE PRACTITIONER

## 2023-11-09 PROCEDURE — 3075F SYST BP GE 130 - 139MM HG: CPT | Performed by: NURSE PRACTITIONER

## 2023-11-09 PROCEDURE — 3080F DIAST BP >= 90 MM HG: CPT | Performed by: NURSE PRACTITIONER

## 2023-11-09 PROCEDURE — 3008F BODY MASS INDEX DOCD: CPT | Performed by: NURSE PRACTITIONER

## 2023-11-09 RX ORDER — AMLODIPINE BESYLATE 10 MG/1
10 TABLET ORAL DAILY
Qty: 90 TABLET | Refills: 3 | Status: SHIPPED | OUTPATIENT
Start: 2023-11-09

## 2024-01-11 ENCOUNTER — LAB ENCOUNTER (OUTPATIENT)
Dept: LAB | Age: 34
End: 2024-01-11
Attending: NURSE PRACTITIONER
Payer: COMMERCIAL

## 2024-01-11 DIAGNOSIS — Z13.29 THYROID DISORDER SCREEN: ICD-10-CM

## 2024-01-11 DIAGNOSIS — Z00.00 WELLNESS EXAMINATION: ICD-10-CM

## 2024-01-11 DIAGNOSIS — Z13.220 LIPID SCREENING: ICD-10-CM

## 2024-01-11 DIAGNOSIS — Z13.21 ENCOUNTER FOR VITAMIN DEFICIENCY SCREENING: ICD-10-CM

## 2024-01-11 DIAGNOSIS — Z13.0 SCREENING FOR DEFICIENCY ANEMIA: ICD-10-CM

## 2024-01-11 LAB
ALBUMIN SERPL-MCNC: 4.3 G/DL (ref 3.2–4.8)
ALBUMIN/GLOB SERPL: 1.6 {RATIO} (ref 1–2)
ALP LIVER SERPL-CCNC: 70 U/L
ALT SERPL-CCNC: 40 U/L
ANION GAP SERPL CALC-SCNC: 7 MMOL/L (ref 0–18)
AST SERPL-CCNC: 32 U/L (ref ?–34)
BASOPHILS # BLD AUTO: 0.06 X10(3) UL (ref 0–0.2)
BASOPHILS NFR BLD AUTO: 1.1 %
BILIRUB SERPL-MCNC: 1 MG/DL (ref 0.3–1.2)
BUN BLD-MCNC: 14 MG/DL (ref 9–23)
BUN/CREAT SERPL: 12.7 (ref 10–20)
CALCIUM BLD-MCNC: 9.1 MG/DL (ref 8.7–10.4)
CHLORIDE SERPL-SCNC: 107 MMOL/L (ref 98–112)
CHOLEST SERPL-MCNC: 162 MG/DL (ref ?–200)
CO2 SERPL-SCNC: 25 MMOL/L (ref 21–32)
CREAT BLD-MCNC: 1.1 MG/DL
DEPRECATED RDW RBC AUTO: 37.6 FL (ref 35.1–46.3)
EGFRCR SERPLBLD CKD-EPI 2021: 91 ML/MIN/1.73M2 (ref 60–?)
EOSINOPHIL # BLD AUTO: 0.26 X10(3) UL (ref 0–0.7)
EOSINOPHIL NFR BLD AUTO: 4.8 %
ERYTHROCYTE [DISTWIDTH] IN BLOOD BY AUTOMATED COUNT: 12.1 % (ref 11–15)
FASTING PATIENT LIPID ANSWER: YES
FASTING STATUS PATIENT QL REPORTED: YES
GLOBULIN PLAS-MCNC: 2.7 G/DL (ref 2.8–4.4)
GLUCOSE BLD-MCNC: 93 MG/DL (ref 70–99)
HCT VFR BLD AUTO: 45.6 %
HDLC SERPL-MCNC: 58 MG/DL (ref 40–59)
HGB BLD-MCNC: 15.6 G/DL
IMM GRANULOCYTES # BLD AUTO: 0.01 X10(3) UL (ref 0–1)
IMM GRANULOCYTES NFR BLD: 0.2 %
LDLC SERPL CALC-MCNC: 92 MG/DL (ref ?–100)
LYMPHOCYTES # BLD AUTO: 1.64 X10(3) UL (ref 1–4)
LYMPHOCYTES NFR BLD AUTO: 30.2 %
MCH RBC QN AUTO: 29.3 PG (ref 26–34)
MCHC RBC AUTO-ENTMCNC: 34.2 G/DL (ref 31–37)
MCV RBC AUTO: 85.7 FL
MONOCYTES # BLD AUTO: 0.5 X10(3) UL (ref 0.1–1)
MONOCYTES NFR BLD AUTO: 9.2 %
NEUTROPHILS # BLD AUTO: 2.96 X10 (3) UL (ref 1.5–7.7)
NEUTROPHILS # BLD AUTO: 2.96 X10(3) UL (ref 1.5–7.7)
NEUTROPHILS NFR BLD AUTO: 54.5 %
NONHDLC SERPL-MCNC: 104 MG/DL (ref ?–130)
OSMOLALITY SERPL CALC.SUM OF ELEC: 288 MOSM/KG (ref 275–295)
PLATELET # BLD AUTO: 249 10(3)UL (ref 150–450)
POTASSIUM SERPL-SCNC: 4 MMOL/L (ref 3.5–5.1)
PROT SERPL-MCNC: 7 G/DL (ref 5.7–8.2)
RBC # BLD AUTO: 5.32 X10(6)UL
SODIUM SERPL-SCNC: 139 MMOL/L (ref 136–145)
TRIGL SERPL-MCNC: 59 MG/DL (ref 30–149)
TSI SER-ACNC: 2.02 MIU/ML (ref 0.55–4.78)
VIT D+METAB SERPL-MCNC: 21.9 NG/ML (ref 30–100)
VLDLC SERPL CALC-MCNC: 10 MG/DL (ref 0–30)
WBC # BLD AUTO: 5.4 X10(3) UL (ref 4–11)

## 2024-01-11 PROCEDURE — 80053 COMPREHEN METABOLIC PANEL: CPT

## 2024-01-11 PROCEDURE — 84443 ASSAY THYROID STIM HORMONE: CPT

## 2024-01-11 PROCEDURE — 36415 COLL VENOUS BLD VENIPUNCTURE: CPT

## 2024-01-11 PROCEDURE — 80061 LIPID PANEL: CPT

## 2024-01-11 PROCEDURE — 85025 COMPLETE CBC W/AUTO DIFF WBC: CPT

## 2024-01-11 PROCEDURE — 82306 VITAMIN D 25 HYDROXY: CPT

## 2024-02-14 NOTE — TELEPHONE ENCOUNTER
Please review; protocol failed/ has no protocol    Requested Prescriptions   Pending Prescriptions Disp Refills    amLODIPine 10 MG Oral Tab 90 tablet 3     Sig: Take 1 tablet (10 mg total) by mouth daily.       Hypertension Medications Protocol Failed - 2/12/2024  4:53 PM        Failed - Last BP reading less than 140/90     BP Readings from Last 1 Encounters:   11/09/23 134/90               Passed - CMP or BMP in past 12 months        Passed - In person appointment or virtual visit in the past 12 mos or appointment in next 3 mos     Recent Outpatient Visits              3 months ago Wellness examination    [facility], [address], [place] Yodit Velez, OZIEL    Office Visit    5 months ago Numbness and tingling in left arm    [facility], [place] Behar, Alex, MD    Telemedicine    7 months ago Numbness and tingling in left arm    [facility], [place] Behar, Alex, MD    Office Visit    9 months ago [facility], [address], [place] Yodit Velez, OZIEL    Office Visit    11 months ago Chest pain, unspecified type    [facility], [address], [place] Yodit Velez, OZIEL    Office Visit                      Passed - EGFRCR or GFRNAA > 50     GFR Evaluation  EGFRCR: 91 , resulted on 1/11/2024             Recent Outpatient Visits              [facility]    Office Visit    5 months ago Numbness and tingling in left arm    [facility], [place] Behar, Alex, MD    Telemedicine    7 months ago Numbness and tingling in left arm    [facility], [place] Behar, Alex, MD    Office Visit    [facility]    [facility], [address], [place] Yodit Velez APRN    Office Visit    11 months ago Chest pain,  unspecified type    [facility], [place] Sas, OZIEL Hess    Office Visit

## 2024-02-15 RX ORDER — AMLODIPINE BESYLATE 10 MG/1
10 TABLET ORAL DAILY
Qty: 90 TABLET | Refills: 3 | Status: SHIPPED | OUTPATIENT
Start: 2024-02-15

## 2025-01-23 ENCOUNTER — PATIENT MESSAGE (OUTPATIENT)
Dept: INTERNAL MEDICINE CLINIC | Facility: CLINIC | Age: 35
End: 2025-01-23

## 2025-01-23 DIAGNOSIS — Z00.00 ANNUAL PHYSICAL EXAM: Primary | ICD-10-CM

## 2025-01-24 NOTE — TELEPHONE ENCOUNTER
Yodit Velez please advise, patient requesting labs prior to his appointment on 01/30 with you. Labs pended for your approval.

## 2025-01-28 ENCOUNTER — LAB ENCOUNTER (OUTPATIENT)
Dept: LAB | Facility: HOSPITAL | Age: 35
End: 2025-01-28
Attending: NURSE PRACTITIONER
Payer: COMMERCIAL

## 2025-01-28 DIAGNOSIS — Z00.00 ANNUAL PHYSICAL EXAM: ICD-10-CM

## 2025-01-28 LAB
ALBUMIN SERPL-MCNC: 4.9 G/DL (ref 3.2–4.8)
ALBUMIN/GLOB SERPL: 1.9 {RATIO} (ref 1–2)
ALP LIVER SERPL-CCNC: 69 U/L
ALT SERPL-CCNC: 36 U/L
ANION GAP SERPL CALC-SCNC: 9 MMOL/L (ref 0–18)
AST SERPL-CCNC: 28 U/L (ref ?–34)
BASOPHILS # BLD AUTO: 0.08 X10(3) UL (ref 0–0.2)
BASOPHILS NFR BLD AUTO: 1.3 %
BILIRUB SERPL-MCNC: 1 MG/DL (ref 0.3–1.2)
BUN BLD-MCNC: 10 MG/DL (ref 9–23)
BUN/CREAT SERPL: 8.8 (ref 10–20)
CALCIUM BLD-MCNC: 10.1 MG/DL (ref 8.7–10.4)
CHLORIDE SERPL-SCNC: 104 MMOL/L (ref 98–112)
CHOLEST SERPL-MCNC: 160 MG/DL (ref ?–200)
CO2 SERPL-SCNC: 27 MMOL/L (ref 21–32)
CREAT BLD-MCNC: 1.14 MG/DL
DEPRECATED RDW RBC AUTO: 35.4 FL (ref 35.1–46.3)
EGFRCR SERPLBLD CKD-EPI 2021: 87 ML/MIN/1.73M2 (ref 60–?)
EOSINOPHIL # BLD AUTO: 0.32 X10(3) UL (ref 0–0.7)
EOSINOPHIL NFR BLD AUTO: 5.1 %
ERYTHROCYTE [DISTWIDTH] IN BLOOD BY AUTOMATED COUNT: 11.6 % (ref 11–15)
FASTING PATIENT LIPID ANSWER: NO
FASTING STATUS PATIENT QL REPORTED: NO
GLOBULIN PLAS-MCNC: 2.6 G/DL (ref 2–3.5)
GLUCOSE BLD-MCNC: 82 MG/DL (ref 70–99)
HCT VFR BLD AUTO: 45.5 %
HDLC SERPL-MCNC: 41 MG/DL (ref 40–59)
HGB BLD-MCNC: 15.8 G/DL
IMM GRANULOCYTES # BLD AUTO: 0.02 X10(3) UL (ref 0–1)
IMM GRANULOCYTES NFR BLD: 0.3 %
LDLC SERPL CALC-MCNC: 100 MG/DL (ref ?–100)
LYMPHOCYTES # BLD AUTO: 1.96 X10(3) UL (ref 1–4)
LYMPHOCYTES NFR BLD AUTO: 31.5 %
MCH RBC QN AUTO: 29.4 PG (ref 26–34)
MCHC RBC AUTO-ENTMCNC: 34.7 G/DL (ref 31–37)
MCV RBC AUTO: 84.7 FL
MONOCYTES # BLD AUTO: 0.5 X10(3) UL (ref 0.1–1)
MONOCYTES NFR BLD AUTO: 8 %
NEUTROPHILS # BLD AUTO: 3.34 X10 (3) UL (ref 1.5–7.7)
NEUTROPHILS # BLD AUTO: 3.34 X10(3) UL (ref 1.5–7.7)
NEUTROPHILS NFR BLD AUTO: 53.8 %
NONHDLC SERPL-MCNC: 119 MG/DL (ref ?–130)
OSMOLALITY SERPL CALC.SUM OF ELEC: 288 MOSM/KG (ref 275–295)
PLATELET # BLD AUTO: 283 10(3)UL (ref 150–450)
POTASSIUM SERPL-SCNC: 4.2 MMOL/L (ref 3.5–5.1)
PROT SERPL-MCNC: 7.5 G/DL (ref 5.7–8.2)
RBC # BLD AUTO: 5.37 X10(6)UL
SODIUM SERPL-SCNC: 140 MMOL/L (ref 136–145)
TRIGL SERPL-MCNC: 100 MG/DL (ref 30–149)
TSI SER-ACNC: 1.65 UIU/ML (ref 0.55–4.78)
VIT D+METAB SERPL-MCNC: 25.9 NG/ML (ref 30–100)
VLDLC SERPL CALC-MCNC: 17 MG/DL (ref 0–30)
WBC # BLD AUTO: 6.2 X10(3) UL (ref 4–11)

## 2025-01-28 PROCEDURE — 84443 ASSAY THYROID STIM HORMONE: CPT

## 2025-01-28 PROCEDURE — 36415 COLL VENOUS BLD VENIPUNCTURE: CPT | Performed by: NURSE PRACTITIONER

## 2025-01-28 PROCEDURE — 82306 VITAMIN D 25 HYDROXY: CPT | Performed by: NURSE PRACTITIONER

## 2025-01-28 PROCEDURE — 80061 LIPID PANEL: CPT

## 2025-01-28 PROCEDURE — 80053 COMPREHEN METABOLIC PANEL: CPT

## 2025-01-28 PROCEDURE — 85025 COMPLETE CBC W/AUTO DIFF WBC: CPT

## 2025-01-30 ENCOUNTER — OFFICE VISIT (OUTPATIENT)
Dept: INTERNAL MEDICINE CLINIC | Facility: CLINIC | Age: 35
End: 2025-01-30
Payer: COMMERCIAL

## 2025-01-30 VITALS
SYSTOLIC BLOOD PRESSURE: 130 MMHG | BODY MASS INDEX: 34.3 KG/M2 | OXYGEN SATURATION: 98 % | HEIGHT: 76 IN | WEIGHT: 281.63 LBS | DIASTOLIC BLOOD PRESSURE: 86 MMHG | HEART RATE: 63 BPM

## 2025-01-30 DIAGNOSIS — I10 ESSENTIAL HYPERTENSION: ICD-10-CM

## 2025-01-30 DIAGNOSIS — Z00.00 WELLNESS EXAMINATION: Primary | ICD-10-CM

## 2025-01-30 DIAGNOSIS — R79.89 LOW SERUM VITAMIN D: ICD-10-CM

## 2025-01-30 RX ORDER — AMLODIPINE BESYLATE 10 MG/1
10 TABLET ORAL DAILY
Qty: 90 TABLET | Refills: 3 | Status: SHIPPED | OUTPATIENT
Start: 2025-01-30

## 2025-01-30 NOTE — PROGRESS NOTES
Armando Warren is a 34 year old male.  HPI:   Pt presents for annual exam, completed labs with mildly low vit D level.   He reports home BP ~130-140/90.   Reports not drinking on weekends for month of January but otherwise reports several drinks per week.   Denies any new complaints.   Has a child at home and expecting 2025.   Current Outpatient Medications   Medication Sig Dispense Refill    amLODIPine 10 MG Oral Tab Take 1 tablet (10 mg total) by mouth daily. 90 tablet 3      Past Medical History:    Essential hypertension      Social History:  Social History     Socioeconomic History    Marital status: Single   Tobacco Use    Smoking status: Former     Types: Cigarettes     Start date:      Quit date: 2016     Years since quittin.0     Passive exposure: Past    Smokeless tobacco: Never   Vaping Use    Vaping status: Former   Substance and Sexual Activity    Alcohol use: Yes     Comment: OCC    Drug use: Yes     Types: Cannabis     Comment: Rare        REVIEW OF SYSTEMS:   Review of Systems   Constitutional:  Negative for activity change, appetite change, fatigue and unexpected weight change.   HENT:  Negative for congestion and dental problem.    Eyes:  Negative for discharge and visual disturbance.   Respiratory:  Negative for cough, chest tightness, shortness of breath and wheezing.    Cardiovascular:  Negative for chest pain, palpitations and leg swelling.   Gastrointestinal:  Negative for abdominal pain, constipation, diarrhea, nausea and vomiting.   Endocrine: Negative.    Genitourinary:  Negative for decreased urine volume, difficulty urinating and frequency.   Musculoskeletal:  Negative for arthralgias and back pain.   Skin:  Negative for color change, pallor and rash.   Neurological:  Negative for dizziness, seizures, numbness and headaches.   Psychiatric/Behavioral:  Negative for behavioral problems, dysphoric mood and suicidal ideas.           EXAM:   /86   Pulse 63   Ht 6' 4\"  (1.93 m)   Wt 281 lb 9.6 oz (127.7 kg)   SpO2 98%   BMI 34.28 kg/m²     Physical Exam  Vitals reviewed.   Constitutional:       General: He is not in acute distress.     Appearance: Normal appearance. He is obese.   HENT:      Head: Normocephalic and atraumatic.      Right Ear: Tympanic membrane, ear canal and external ear normal. There is no impacted cerumen.      Left Ear: Tympanic membrane, ear canal and external ear normal. There is no impacted cerumen.      Nose: Nose normal.      Mouth/Throat:      Mouth: Mucous membranes are moist.      Pharynx: Oropharynx is clear.   Eyes:      General: No scleral icterus.        Right eye: No discharge.         Left eye: No discharge.      Extraocular Movements: Extraocular movements intact.      Conjunctiva/sclera: Conjunctivae normal.      Pupils: Pupils are equal, round, and reactive to light.   Neck:      Thyroid: No thyroid mass or thyromegaly.   Cardiovascular:      Rate and Rhythm: Normal rate and regular rhythm.      Pulses: Normal pulses.      Heart sounds: Normal heart sounds. No murmur heard.  Pulmonary:      Effort: Pulmonary effort is normal. No respiratory distress.      Breath sounds: Normal breath sounds.   Abdominal:      General: Abdomen is flat. Bowel sounds are normal. There is no distension.      Palpations: Abdomen is soft. There is no mass.      Tenderness: There is no abdominal tenderness.   Musculoskeletal:         General: Normal range of motion.      Cervical back: Normal range of motion and neck supple.      Right lower leg: No edema.      Left lower leg: No edema.   Lymphadenopathy:      Cervical: No cervical adenopathy.      Upper Body:      Right upper body: No supraclavicular adenopathy.      Left upper body: No supraclavicular adenopathy.   Skin:     General: Skin is warm and dry.      Capillary Refill: Capillary refill takes less than 2 seconds.      Coloration: Skin is not jaundiced.   Neurological:      General: No focal deficit  present.      Mental Status: He is alert and oriented to person, place, and time.   Psychiatric:         Mood and Affect: Mood normal.         Judgment: Judgment normal.            ASSESSMENT AND PLAN:     Assessment & Plan  Wellness examination  Education provided on healthy lifestyle.  Diet: reduce saturated fats, simple carbs and excess sugars. Hydrate. Leafy greens, legumes, nuts/seeds, healthy sources of Omega 3, lean proteins, complex carbs, berries.   Exercise 30 min daily cardio as tolerated.   Immunizations reviewed and recommendations provided  Preventative health screening recommendations reviewed.   Previous lab and imaging results reviewed.         Essential hypertension  -refilled Amlodipine 10 mg.  -Discussed adding hydrochlorothiazide, pt declines 2nd agent for lowering BP at this time. States he knows he needs to make lifestyle adjustments to lower BP. Reviewed concerning s/s and health risks of HTN. Pt verbalized understanding. He will f/u in 3 months for BP check.   Orders:    amLODIPine 10 MG Oral Tab; Take 1 tablet (10 mg total) by mouth daily.    Low serum vitamin D  -Start OTC 1000 units of Vitamin D3 once daily with food for 6 months.             The patient indicates understanding of these issues and agrees to the plan.  The patient is asked to return in 3 months. .     The above note was creating using Dragon speech recognition technology. Please excuse any typos.

## 2025-04-07 ENCOUNTER — PATIENT MESSAGE (OUTPATIENT)
Dept: INTERNAL MEDICINE CLINIC | Facility: CLINIC | Age: 35
End: 2025-04-07

## 2025-04-14 NOTE — TELEPHONE ENCOUNTER
Per 1/30/25 visit:    Essential hypertension  -refilled Amlodipine 10 mg.  -Discussed adding hydrochlorothiazide, pt declines 2nd agent for lowering BP at this time. States he knows he needs to make lifestyle adjustments to lower BP. Reviewed concerning s/s and health risks of HTN. Pt verbalized understanding. He will f/u in 3 months for BP check.   Orders:    amLODIPine 10 MG Oral Tab; Take 1 tablet (10 mg total) by mouth daily.    No future appointments.

## 2025-04-15 NOTE — TELEPHONE ENCOUNTER
Recommending follow up, last visit was 3 months ago, needs to provide BP readings/log. Thank you!

## 2025-04-15 NOTE — TELEPHONE ENCOUNTER
OZIEL Sas - please see new message from patient and advise.   Please respond directly to the patient if no additional staff support is required.